# Patient Record
Sex: MALE
[De-identification: names, ages, dates, MRNs, and addresses within clinical notes are randomized per-mention and may not be internally consistent; named-entity substitution may affect disease eponyms.]

---

## 2020-04-13 ENCOUNTER — APPOINTMENT (OUTPATIENT)
Dept: DISASTER EMERGENCY | Facility: CLINIC | Age: 25
End: 2020-04-13
Payer: COMMERCIAL

## 2020-04-13 VITALS
HEIGHT: 67 IN | OXYGEN SATURATION: 98 % | SYSTOLIC BLOOD PRESSURE: 98 MMHG | DIASTOLIC BLOOD PRESSURE: 60 MMHG | WEIGHT: 155 LBS | BODY MASS INDEX: 24.33 KG/M2 | TEMPERATURE: 98.3 F | HEART RATE: 60 BPM | RESPIRATION RATE: 14 BRPM

## 2020-04-13 DIAGNOSIS — R05 COUGH: ICD-10-CM

## 2020-04-13 DIAGNOSIS — R68.89 OTHER GENERAL SYMPTOMS AND SIGNS: ICD-10-CM

## 2020-04-13 DIAGNOSIS — R50.9 FEVER, UNSPECIFIED: ICD-10-CM

## 2020-04-13 DIAGNOSIS — U07.1 COVID-19: ICD-10-CM

## 2020-04-13 PROBLEM — Z00.00 ENCOUNTER FOR PREVENTIVE HEALTH EXAMINATION: Status: ACTIVE | Noted: 2020-04-13

## 2020-04-13 PROCEDURE — 99213 OFFICE O/P EST LOW 20 MIN: CPT

## 2020-04-14 LAB — SARS-COV-2 N GENE NPH QL NAA+PROBE: NOT DETECTED

## 2020-05-25 PROBLEM — R50.9 FEVER: Status: ACTIVE | Noted: 2020-05-25

## 2020-05-25 PROBLEM — R05 COUGH: Status: ACTIVE | Noted: 2020-05-25

## 2020-05-25 PROBLEM — U07.1 2019 NOVEL CORONAVIRUS DISEASE (COVID-19): Status: ACTIVE | Noted: 2020-05-25

## 2020-05-25 NOTE — HISTORY OF PRESENT ILLNESS
[Patient presents to the office today for COVID-19 evaluation and testing.] : Patient presents to the office today for COVID-19 evaluation and testing. [Patient has been pre-screened by RN at call center for appointment today with our facility.] : Patient has been pre-screened by RN at call center for appointment today with our facility. [] : no dizziness on standing [With Confirmed Case] : patient exposed to a confirmed case of COVID-19 [Lung Disease] : lung disease [None] : none [Clear] : clear [Normal O2 sat at rest] : normal O2 sat at rest [Grossly normal, interacts, not tired or weak] : grossly normal, interacts, not tired or weak [Discharged with current Quarantine instructions and advised of signs of worsening illness.] : Patient discharged with current quarantine instructions and advised of signs of worsening illness. Patient told to seek emergent care if symptoms occur. [FreeTextEntry1] : FORREST JONIROZ is a  26 Y/O  patient   CC:"  I had a temp last week a cough and a headache   "\par \par PHMx:None\par \par HPI:\par  [TextBox_48] : Instructed patient to seek emergency medical treatment if SOB at rest develops, not able to speak in full sentences\par or worsening of any symptoms.\par Stay home when you are sick and attempt to self-quarantine yourself. \par · Try to choose a room in your home that can be used to separate sick household members from self-quarantined; preferably with a separate bathroom if possible. \par · Try to avoid close contact (within 6 feet or 2 meters) with other people and keep any interactions brief; as brief interactions are less likely to result in transmission. \par · If you have a mask and can tolerate wearing it; use it when around people. \par · If you have masks but can’t tolerate wearing it have other people wear a mask when in the same room. \par · You should not share dishes, drinking glasses, cups, eating utensils, towels, or bedding with other people or pets in your home. After using these items, they should be washed thoroughly with soap and water \par · Make sure that shared spaces in the home have good air flow, such as by an air conditioner or an opened window, weather permitting. \par · Prohibit visitors who do not have an essential need to be in the home. \par · You should restrict contact with pets and other animals while you are sick.\par \par

## 2024-10-16 ENCOUNTER — EMERGENCY (EMERGENCY)
Facility: HOSPITAL | Age: 29
LOS: 1 days | Discharge: HOME | End: 2024-10-16
Attending: EMERGENCY MEDICINE | Admitting: EMERGENCY MEDICINE
Payer: MEDICAID

## 2024-10-16 VITALS
SYSTOLIC BLOOD PRESSURE: 117 MMHG | OXYGEN SATURATION: 98 % | TEMPERATURE: 98 F | HEART RATE: 70 BPM | DIASTOLIC BLOOD PRESSURE: 79 MMHG | RESPIRATION RATE: 18 BRPM | WEIGHT: 160.06 LBS | HEIGHT: 66 IN

## 2024-10-16 PROCEDURE — 99284 EMERGENCY DEPT VISIT MOD MDM: CPT

## 2024-10-16 RX ORDER — VALACYCLOVIR 1000 MG/1
1 TABLET ORAL
Qty: 21 | Refills: 0
Start: 2024-10-16 | End: 2024-10-22

## 2024-10-16 RX ORDER — PREDNISONE 5 MG/1
40 TABLET ORAL ONCE
Refills: 0 | Status: COMPLETED | OUTPATIENT
Start: 2024-10-16 | End: 2024-10-16

## 2024-10-16 RX ORDER — ACETAMINOPHEN 325 MG
975 TABLET ORAL ONCE
Refills: 0 | Status: COMPLETED | OUTPATIENT
Start: 2024-10-16 | End: 2024-10-16

## 2024-10-16 RX ORDER — PREDNISONE 5 MG/1
2 TABLET ORAL
Qty: 10 | Refills: 0
Start: 2024-10-16 | End: 2024-10-20

## 2024-10-16 RX ORDER — PREDNISONE 5 MG/1
3 TABLET ORAL
Qty: 21 | Refills: 0
Start: 2024-10-16 | End: 2024-10-22

## 2024-10-16 RX ORDER — VALACYCLOVIR 1000 MG/1
1000 TABLET ORAL ONCE
Refills: 0 | Status: COMPLETED | OUTPATIENT
Start: 2024-10-16 | End: 2024-10-16

## 2024-10-16 RX ADMIN — VALACYCLOVIR 1000 MILLIGRAM(S): 1000 TABLET ORAL at 11:49

## 2024-10-16 RX ADMIN — Medication 975 MILLIGRAM(S): at 12:05

## 2024-10-16 RX ADMIN — PREDNISONE 40 MILLIGRAM(S): 5 TABLET ORAL at 11:49

## 2024-10-16 NOTE — ED PROVIDER NOTE - NSFOLLOWUPINSTRUCTIONS_ED_ALL_ED_FT
Follow up with your primary care doctor and a neurologist in the next 2-3 days. Return to the ED for any new or concerning symptoms. Take medications as prescribed.     Bell's Palsy  Skip Navigation  Condition Basics  What is Bell's palsy?  Bell's palsy is a paralysis or weakness of the muscles on one side of your face. Damage to the facial nerve that controls muscles on one side of the face causes that side of your face to droop. The nerve damage may also affect your sense of taste and how you make tears and saliva. This condition comes on suddenly, often overnight, and usually gets better on its own within a few weeks.    Bell's palsy is not the result of a stroke or a transient ischemic attack (TIA). While stroke and TIA can cause facial paralysis, there is no link between Bell's palsy and either of these conditions. But sudden weakness that occurs on one side of your face should be checked by a doctor right away to rule out these more serious causes.    What causes it?  The cause of Bell's palsy is not clear. Most cases are thought to be caused by the herpes virus that causes cold sores.    In most cases of Bell's palsy, the nerve that controls muscles on one side of the face is damaged by inflammation.    Many health problems can cause weakness or paralysis of the face. If a specific reason cannot be found for the weakness, the condition is called Bell's palsy.    What are the symptoms?  Symptoms of Bell's palsy include:    Sudden weakness or paralysis on one side of your face that causes it to droop. This is the main symptom. It may make it hard for you to close your eye on that side of your face.  Drooling.  Eye problems, such as excessive tearing or a dry eye.  Loss of ability to taste.  Pain in or behind your ear.  Numbness in the affected side of your face.  Increased sensitivity to sound.  How is it diagnosed?  Your doctor may diagnose Bell's palsy by asking you questions, such as about how your symptoms developed. He or she will also give you a physical and neurological exam to check facial nerve function.    If the cause of your symptoms is not clear, you may need other tests, such as blood tests, an MRI, or a CT scan.    How is Bell's palsy treated?  Most people who have Bell's palsy recover completely, without treatment, in 1 to 2 months. [id number] is especially true for people who can still partly move their facial muscles. But a small number of people may have permanent muscle weakness or other problems on the affected side of the face.    Treatment with corticosteroid medicines (such as prednisone) can make it more likely that you will regain all facial movement. They work best if they are taken soon after symptoms start (within 3 days). Sometimes antiviral medicines (such as acyclovir) may be added to corticosteroid medicines to treat Bell's palsy. But evidence for using antiviral medicines is weak. They may help in some cases, but in general they do not affect recovery. footnote2    Some people may not be able to take corticosteroid medicines because of other health problems. It's important to remember that most people with Bell's palsy recover completely without any treatment.    How can you care for yourself?  Facial exercises  As the nerve in your face begins to work again, doing simple exercises-such as tightening and relaxing your facial muscles-may make those muscles stronger and help you recover more quickly. Massaging your forehead, cheeks, and lips with oil or cream may also help.    Eye care  If you can't blink or close your eye fully, your eye may become dry. A dry eye can lead to sores and serious vision problems. To help protect the eye and keep it moist:    Use your finger to close and open your eyelid often throughout the day.  Use eyedrops ("artificial tears") or ointment. Those that contain methylcellulose are a good choice and don't require a prescription. You may want to use drops during the day and ointment at night while you sleep. Ask your doctor how often to use the drops.  Wear an eye patch while you sleep, and wear glasses or goggles the rest of the time.  Mouth care  If you have no feeling and little saliva on one side of your tongue, food may get stuck there, leading to gum disease or tooth decay. Brush and floss your teeth often and well to help prevent these problems. To prevent swallowing problems, eat slowly and chew your food well. Eating soft, smooth foods, such as yogurt, may also help.

## 2024-10-16 NOTE — ED PROVIDER NOTE - PHYSICAL EXAMINATION
Physical Exam:  General: NAD, Conversive  Eyes: EOMI, Conjunctiva and sclera clear  Ears: No vesicles, TM normal  Neck: No JVD  Lungs: Clear to auscultation bilaterally, no wheeze, no rhonchi  Heart: Normal S1, S2, no murmurs  Abdomen: Soft, nontender, nondistended, no CVA tenderness  Extremities: 2+ peripheral pulses, no edema  Psych: AAO X3  Neurologic: Left-sided upper and lower facial weakness with intact sensation.  No other neurologic deficits. Physical Exam:  General: NAD, Conversive  Eyes: EOMI, Conjunctiva and sclera clear  Ears: No vesicles, TM normal  Neck: No JVD  Lungs: Clear to auscultation bilaterally, no wheeze, no rhonchi  Heart: Normal S1, S2, no murmurs  Abdomen: Soft, nontender, nondistended, no CVA tenderness  Extremities: 2+ peripheral pulses, no edema  Psych: AAO X3  Skin: no rash  Neurologic: Left-sided upper and lower facial weakness with intact sensation.  No other neurologic deficits.

## 2024-10-16 NOTE — ED PROVIDER NOTE - PATIENT PORTAL LINK FT
You can access the FollowMyHealth Patient Portal offered by [facility] by registering at the following website: [email or web address]. By joining [employer]’s FollowMyHealth portal, you will also be able to view your health information using other applications (apps) compatible with our system.

## 2024-10-16 NOTE — ED ADULT NURSE NOTE - CHIEF COMPLAINT QUOTE
c/o left sided neck pain with slurred speech and facial asymmetry onset on 10/10 while in [facility], pt also reports change in taste. April Martin.

## 2024-10-16 NOTE — ED ADULT NURSE NOTE - OBJECTIVE STATEMENT
Pt received to intake. pt is AxO 3 and ambulatory. pt c/o left sided neck pain with sweeling to the left side of his neck. pt respiratiosne vena nd unalbored. Pt eneis headaches,     c/o left sided neck pain with slurred speech and facial asymmetry onset on 10/10 while in [facility], pt also reports change in taste. April Ballardx. Pt received to intake. pt is AxO 3 and ambulatory. pt c/o left sided neck pain with swelling to the left side of his neck. pt respirations even and unlabored. Pt denies headaches, dizziness, n/v/d, abdominal pain, SOB, chest pian, or fever like symptoms. -BEFAST. pt pt endorses it began x 1 week ago from [facility]. pt medicated as prescribed. plan of care ongoing.

## 2024-10-16 NOTE — ED PROVIDER NOTE - OBJECTIVE STATEMENT
This is a 29-year-old male with no known medical history presenting for left-sided facial droop.  Patient reports over the last few days he has been feeling pain on the left side of his head around his ear, forehead, left side of his neck.  Today he noticed that his face looked asymmetric so he came to the ED.  He denies fevers, chills, numbness or weakness of other parts of the body.  He also reports a strange taste on the left side of his tongue.  He is unsure if he has ever had chickenpox, was born in the [place], unsure if he was vaccinated for chickenpox.  He has not noticed any rash.

## 2024-10-16 NOTE — ED ADULT NURSE NOTE - BEFAST SPEECH PHRASE
Drink plenty of fluids to maintain hydration. Especially focus on fluids with calories, like sprite or Gatorade.  Tylenol as needed for fevers.  Return with any new or worsening symptoms, or any concerns.       No

## 2024-10-16 NOTE — ED PROVIDER NOTE - ATTENDING CONTRIBUTION TO CARE
This is a 29-year-old male with history as above presenting for facial weakness and left-sided facial pain.  Vitals unremarkable, exam as above.  Not consistent with stroke given upper and lower facial weakness.  Differential includes Bell's palsy versus Winifred Hunt syndrome, no vesicles to suggest Winifred Hunt syndrome but pain is concerning for this.  Will treat for both etiologies and discharged with neurology follow-up, return precautions.

## 2024-10-16 NOTE — ED PROVIDER NOTE - CLINICAL SUMMARY MEDICAL DECISION MAKING FREE TEXT BOX
Walk in This is a 29-year-old male with history as above presenting for facial weakness and left-sided facial pain.  Vitals unremarkable, exam as above.  Not consistent with stroke given upper and lower facial weakness.  Differential includes Bell's palsy versus Winifred Hunt syndrome, no vesicles to suggest Winifred Hunt syndrome but pain is concerning for this.  Will treat for both etiologies and discharged with neurology follow-up, return precautions.

## 2024-10-16 NOTE — ED ADULT TRIAGE NOTE - CHIEF COMPLAINT QUOTE
c/o left sided neck pain with slurred speech and facial asymmetry onset on 10/10 while in [facility], pt also reports change in taste. April Martin.

## 2024-10-26 ENCOUNTER — EMERGENCY (EMERGENCY)
Facility: HOSPITAL | Age: 29
LOS: 1 days | Discharge: HOME | End: 2024-10-26
Attending: EMERGENCY MEDICINE | Admitting: EMERGENCY MEDICINE
Payer: MEDICAID

## 2024-10-26 VITALS
SYSTOLIC BLOOD PRESSURE: 114 MMHG | DIASTOLIC BLOOD PRESSURE: 68 MMHG | HEART RATE: 72 BPM | TEMPERATURE: 98 F | RESPIRATION RATE: 18 BRPM | OXYGEN SATURATION: 98 %

## 2024-10-26 VITALS
HEIGHT: 66 IN | TEMPERATURE: 98 F | HEART RATE: 73 BPM | OXYGEN SATURATION: 98 % | WEIGHT: 160.06 LBS | DIASTOLIC BLOOD PRESSURE: 71 MMHG | RESPIRATION RATE: 18 BRPM | SYSTOLIC BLOOD PRESSURE: 116 MMHG

## 2024-10-26 PROCEDURE — 99284 EMERGENCY DEPT VISIT MOD MDM: CPT

## 2024-10-26 RX ORDER — KETOROLAC TROMETHAMINE 10 MG/1
15 TABLET, FILM COATED ORAL ONCE
Refills: 0 | Status: DISCONTINUED | OUTPATIENT
Start: 2024-10-26 | End: 2024-10-26

## 2024-10-26 RX ORDER — LIDOCAINE 50 MG/G
1 CREAM TOPICAL ONCE
Refills: 0 | Status: COMPLETED | OUTPATIENT
Start: 2024-10-26 | End: 2024-10-26

## 2024-10-26 RX ADMIN — KETOROLAC TROMETHAMINE 15 MILLIGRAM(S): 10 TABLET, FILM COATED ORAL at 22:32

## 2024-10-26 RX ADMIN — LIDOCAINE 1 PATCH: 50 CREAM TOPICAL at 22:32

## 2024-10-26 NOTE — ED PROVIDER NOTE - NSPTACCESSSVCSAPPTDETAILS_ED_ALL_ED_FT
recent dx of lt sided bell palsy with associated left sided ear/neck pain but with no signs of bacterail infection

## 2024-10-26 NOTE — ED PROVIDER NOTE - MUSCULOSKELETAL NECK EXAM
ttp to lt trapezius with hypertoncity/no deformity, pain or tenderness. no restriction of movement/supple/trachea midline

## 2024-10-26 NOTE — ED PROVIDER NOTE - ENMT, MLM
Airway patent, Nasal mucosa clear. Mouth with normal mucosa. Throat has no vesicles, no oropharyngeal exudates and uvula is midline. No vesicle in ear b/l TM clear wwith good cone of light and no edema/ discharge to ear canal

## 2024-10-26 NOTE — ED PROVIDER NOTE - PATIENT PORTAL LINK FT
You can access the FollowMyHealth Patient Portal offered by [facility] by registering at the following website: [email or web address]. By joining [employer]’s FollowMyHealth portal, you will also be able to view your health information using other applications (apps) compatible with our system.

## 2024-10-26 NOTE — ED PROVIDER NOTE - NSFOLLOWUPINSTRUCTIONS_ED_ALL_ED_FT
**You are seen in the emergency room due to continued left-sided pain after being diagnosed with Bell's palsy.  You have completed your course of viral medications including valacyclovir, and steroids prednisone to help alleviate symptoms.  You have improved sensation to the left upper side of your face which is showing good prognosis however we have referred you to neurology for outpatient follow-up.  You may take acetaminophen 1000 mg every 6 hours, and ibuprofen 600 mg every 8 hours as needed for pain.  Please return if you are having any new, worsening, or concerning symptoms that develop. You have been provided a referral to the specialist as discussed, the discharge lounge will help schedule an appointment for you. If you do not receive a call within 2 days you may call [phone] extension number [phone] or [phone] to speak to someone from the discharge lounge. Thank you. **You are seen in the emergency room due to continued left-sided pain after being diagnosed with Bell's palsy.  You have completed your course of viral medications including valacyclovir, and steroids prednisone to help alleviate symptoms.  You have improved sensation to the left upper side of your face which is showing good prognosis however we have referred you to neurology for outpatient follow-up.  You may take acetaminophen 1000 mg every 6 hours, and ibuprofen 600 mg every 8 hours as needed for pain.  Please return if you are having any new, worsening, or concerning symptoms that develop. Please follow up with the neurology on 11/06/24 as scheduled.

## 2024-10-26 NOTE — ED PROVIDER NOTE - ATTENDING APP SHARED VISIT CONTRIBUTION OF CARE
Agree with PA note  29-year-old male with recent diagnosis of Bell's palsy on October 16 presents with pain to left side of neck.  Patient denies any neurological symptoms such as diplopia, gait disturbance, confusion.  Denies vomiting or systemic symptoms such as fever.  Denies midline neck pain.  States Bell's palsy on left side of face is improved.  Physical exam  Well-appearing male in no respiratory distress  Vital signs stable  Left-sided Bell's palsy  Neck supple, no midline tenderness, spasm in left lateral aspect of cervical spine  Clear to auscultation bilaterally  Neuro , 5/5 motor upper and lower extremities, left 7th nerve palsy  Impression  No meningismus symptoms or signs, appears to be neck strain from likely overcompensation due to Bell's palsy  Will treat as muscle spasm and reassess

## 2024-10-26 NOTE — ED PROVIDER NOTE - OBJECTIVE STATEMENT
29-year-old male recently diagnosed with Bell's palsy on 10/16/2024 due to left-sided facial droop associated with pain to the left side of the head around his ear forehead and his neck.  Patient was discharged home with prednisone, and valacyclovir/acyclovir which patient states he has completed.  Patient states the facial droop has improved by the eye however still present by the mouth and the pain is also still present however is not worsening.    Denies fevers, chills, neck stiffness, pain with neck movement, ear pain, ear discharge, sore throat, chest pain, shortness of breath, palpitations, abdominal pain, nausea, vomiting, diarrhea, double vision, tinnitus, paresthesias, weakness, syncope, injury or trauma. 29-year-old male recently diagnosed with Bell's palsy on 10/16/2024 due to left-sided facial droop associated with pain to the left side of the head around his ear forehead and his neck.  Patient was discharged home with prednisone, and valacyclovir/acyclovir which patient states he has completed.  Patient states the facial droop has improved by the eye however still present by the mouth and the pain is also still present however is not worsening. Has appt with neurology on 11/06/24    Denies fevers, chills, neck stiffness, pain with neck movement, ear pain, ear discharge, sore throat, chest pain, shortness of breath, palpitations, abdominal pain, nausea, vomiting, diarrhea, double vision, tinnitus, paresthesias, weakness, syncope, injury or trauma.

## 2024-10-26 NOTE — ED PROVIDER NOTE - PROGRESS NOTE DETAILS
GULSHAN Silva: Patient states pain has significantly improved.  Ranging neck more comfortably. He feels well enough to go home.  Patient advised to follow-up with the neurologist on November 6 that he already has schedule and to return if there is any new or worsening symptoms that occur.  Patient verbalized an understanding and agrees with the plan

## 2024-10-26 NOTE — ED ADULT NURSE NOTE - OBJECTIVE STATEMENT
pt received to wellness with co lt sided neck pain going into head. states he has had the pain for about 3weeks. states it comes and goes and got bad a couple of days ago. denies trauma, states he was dx with bells palsy on lt side of face 2 weeks ago. medicated as ordered

## 2024-10-26 NOTE — ED PROVIDER NOTE - CLINICAL SUMMARY MEDICAL DECISION MAKING FREE TEXT BOX
29-year-old male recently diagnosed with Bell's palsy on 10/16/2024 due to left-sided facial droop associated with pain to the left side of the head around his ear forehead and his neck.  Patient was discharged home with prednisone, and valacyclovir/acyclovir which patient states he has completed.  Patient states the facial droop has improved by the eye however still present by the mouth and the pain is also still present however is not worsening.    Patient presenting with neck pain following traumatic event.  Given lack of physical exam findings including no spinous process TTP, no decreased ROM, no neurosensory/motor deficits, deferred cervical imaging at this time. No meninggal signs/sxs despite recent bells palsy dx discomfort is likely 2/2 viral illness.  [place] C-spine rules were negative. At that time, patient was able to be cleared clinically.  Symptomatic care was discussed. Patient was advised to followup with primary physician in 3-7 days if continuing to have neck pain. Advised to present to ER if having signs of peripheral numbness/weakness/tingling, severe pain, or other concerns.    Plan for [facility] pain control and refer to neurology for outpt f/ due to pt's recent dx and concern

## 2024-10-26 NOTE — ED ADULT TRIAGE NOTE - CHIEF COMPLAINT QUOTE
c/p left sided neck pain onset 3 days ago, reports was recently seen in ER for Bels palsy, slurred speech and left sided facial droop noted, pt reports  finished Valacyclovir medications and is currently taking steroids with no relief. able to tolerate own secretions, breathing is even and unlabored.